# Patient Record
Sex: FEMALE | Race: WHITE | Employment: FULL TIME | URBAN - METROPOLITAN AREA
[De-identification: names, ages, dates, MRNs, and addresses within clinical notes are randomized per-mention and may not be internally consistent; named-entity substitution may affect disease eponyms.]

---

## 2019-04-12 ENCOUNTER — OFFICE VISIT (OUTPATIENT)
Dept: ENDOCRINOLOGY | Age: 49
End: 2019-04-12
Payer: COMMERCIAL

## 2019-04-12 VITALS
OXYGEN SATURATION: 99 % | DIASTOLIC BLOOD PRESSURE: 90 MMHG | SYSTOLIC BLOOD PRESSURE: 148 MMHG | HEART RATE: 84 BPM | HEIGHT: 68 IN | WEIGHT: 192 LBS | BODY MASS INDEX: 29.1 KG/M2 | RESPIRATION RATE: 16 BRPM

## 2019-04-12 DIAGNOSIS — E55.9 VITAMIN D DEFICIENCY: ICD-10-CM

## 2019-04-12 DIAGNOSIS — R53.82 CHRONIC FATIGUE: Primary | ICD-10-CM

## 2019-04-12 PROCEDURE — 4004F PT TOBACCO SCREEN RCVD TLK: CPT | Performed by: INTERNAL MEDICINE

## 2019-04-12 PROCEDURE — 99204 OFFICE O/P NEW MOD 45 MIN: CPT | Performed by: INTERNAL MEDICINE

## 2019-04-12 PROCEDURE — G8427 DOCREV CUR MEDS BY ELIG CLIN: HCPCS | Performed by: INTERNAL MEDICINE

## 2019-04-12 PROCEDURE — G8419 CALC BMI OUT NRM PARAM NOF/U: HCPCS | Performed by: INTERNAL MEDICINE

## 2019-04-12 RX ORDER — CHOLECALCIFEROL (VITAMIN D3) 1250 MCG
CAPSULE ORAL WEEKLY
COMMUNITY

## 2019-04-12 RX ORDER — NAPROXEN 375 MG/1
375 TABLET ORAL 2 TIMES DAILY WITH MEALS
COMMUNITY
End: 2021-09-20

## 2019-04-12 RX ORDER — ALPRAZOLAM 0.25 MG/1
0.25 TABLET ORAL NIGHTLY PRN
COMMUNITY

## 2019-04-12 NOTE — LETTER
[address] [place] [address] Department of Endocrinology Diabetes and Metabolism       Provider: Latrice Robbins MD  [address] [facility] [address]   Phone: [phone]  Fax: [phone]    Primary Care Physician: Lesli Moralez DO   Referring Provider: Roseanne Beckman DO    Patient: Margret Mendoza  YOB: 1970  Date of Visit: 4/12/2019      Dear Dr. Lesli Moralez DO   I had the pleasure of seeing your patient Margret Mendoza today at endocrine clinic for consultation visit and I enclosed a copy of the office visit completed today. Thank you very much for asking us to participate in the care of this very pleasant patient. Please don't hesitate to call if there are any further questions or concerns. Sincerely   Latrice Robbins MD  Endocrinologist, [facility]   [address] [facility] [address]   Phone: [phone]  Fax: [phone]      ENDOCRINOLOGY CLINIC NOTE    Date of Service: 4/12/2019    Medical Records Reviewed:   I personally reviewed and summarized previous records     Care Team:   Primary Care Physician: Lesli Moralez DO  Referring physician: Roseanne Beckman DO  Provider: Latrice Robbins MD        Reason for the visit:  Fatigue, evaluation of possible adrenal insufficiency     History of Present Illness: The history is provided by the patient. No  was used. Accuracy of the patient data is excellent.          Margret Mendoza is a very pleasant 54 y.o. female with past medical history of fibromyalgia seen in the clinic today for evaluation of possible endocrine etiology of her current fatigue and tiredness   Over the past 7 years patient was c/o tiredness and fatigue and was told that she had adrenal fatigue   I reviewed all previous cortisol level and most readings were done later in the day as shown below   On last lab (3/17/2016) pt admit that she was taking adrenal support supplements   Component 9/28/2016 5/26/2016 3/17/2016 12:00 PM 12:00 PM 10:50 AM   Cortisol 7.12 5.82 5.17     Pt stopped all these supplements almost 8 months ago    Today she still c/o fatigue, dry skin, brittle fingernails, brittle hair  and depressed mood.     Previous work showed normal thyroid function test.     PAST MEDICAL HISTORY   Past Medical History:   Diagnosis Date    Anxiety     Cervical dysplasia     History LEEP 's    Fibromyalgia     Gestational diabetes 2006    PMS (premenstrual syndrome)     RA [facility]oid [facility]) ([facility] [address]     Stress headaches      PAST SURGICAL HISTORY   Past Surgical History:   Procedure Laterality Date    CARPAL TUNNEL RELEASE      both hands     SECTION      CHOLECYSTECTOMY      COLPOSCOPY      DILATION AND CURETTAGE OF UTERUS      LEEP      NASAL SINUS SURGERY      TUBAL LIGATION       SOCIAL HISTORY   Social History     Socioeconomic History    Marital status:      Spouse name: Not on file    Number of children: Not on file    Years of education: Not on file    Highest education level: Not on file   Occupational History    Not on file   Social Needs    Financial resource strain: Not on file    Food insecurity:     Worry: Not on file     Inability: Not on file    Transportation needs:     Medical: Not on file     Non-medical: Not on file   Tobacco Use    Smoking status: Current Every Day Smoker     Packs/day: 0.25     Types: Cigarettes    Smokeless tobacco: Never Used   Substance and Sexual Activity    Alcohol use: No     Alcohol/week: 0.0 oz    Drug use: No    Sexual activity: Yes     Partners: Male   Lifestyle    Physical activity:     Days per week: Not on file     Minutes per session: Not on file    Stress: Not on file   Relationships    Social connections:     Talks on phone: Not on file     Gets together: Not on file     Attends Confucianist service: Not on file     Active member of club or organization: Not on file     Attends meetings of clubs or organizations: Not on file Relationship status: Not on file    Intimate partner violence:     Fear of current or ex partner: Not on file     Emotionally abused: Not on file     Physically abused: Not on file     Forced sexual activity: Not on file   Other Topics Concern    Not on file   Social History Narrative    Not on file     FAMILY HISTORY   Family History   Problem Relation Age of Onset    Thyroid Disease Mother     Diabetes Mother         pre diabetic    Heart Disease Father     Heart Attack Father 52            Diabetes Maternal Grandmother     Heart Attack Maternal Grandmother     Heart Disease Maternal Grandmother     High Blood Pressure Maternal Grandmother     Stroke Maternal Grandmother     Diabetes Maternal Aunt     Diabetes Maternal Grandfather      ALLERGIES AND DRUG REACTIONS   No Known Allergies    CURRENT MEDICATIONS   Current Outpatient Medications   Medication Sig Dispense Refill    Cholecalciferol (VITAMIN D3) 12511 units CAPS Take by mouth once a week      ALPRAZolam (XANAX) 0.25 MG tablet Take 0.25 mg by mouth nightly as needed for Sleep.  naproxen (NAPROSYN) 375 MG tablet Take 375 mg by mouth 2 times daily (with meals)      medical marijuana Take by mouth as needed.  HYDROcodone-acetaminophen (NORCO)  MG per tablet take 1 tablet by mouth three times a day  0    cyclobenzaprine (FLEXERIL) 10 MG tablet Take 10 mg by mouth as needed for Muscle spasms       No current facility-administered medications for this visit. Review of Systems  Constitutional: No fever, no chills, no diaphoresis, no generalized weakness. HEENT: No blurred vision, No sore throat, no ear pain, no hair loss  Neck: denied any neck swelling, difficulty swallowing,   Cadrdiopulomary: No CP, SOB or palpitation, No orthopnea or PND. No cough or wheezing.   GI: No N/V/D, no constipation, No abdominal pain, no melena or hematochezia : Denied any dysuria, hematuria, flank pain, discharge, or incontinence. Skin: denied any rash, ulcer, Hirsute, or hyperpigmentation. MSK: denied any joint deformity, joint pain/swelling, muscle pain, or back pain. Neuro: no numbess, no tingling, no weakness,     OBJECTIVE    BP (!) 148/90 (Site: Right Upper Arm, Position: Sitting, Cuff Size: Medium Adult)   Pulse 84   Resp 16   Ht 5' 8\" (1.727 m)   Wt 192 lb (87.1 kg)   LMP 09/01/2015   SpO2 99%   BMI 29.19 kg/m²    BP Readings from Last 4 Encounters:   04/12/19 (!) 148/90   09/28/16 124/76   07/27/16 130/80   05/26/16 124/78     Wt Readings from Last 6 Encounters:   04/12/19 192 lb (87.1 kg)   09/28/16 195 lb 3.2 oz (88.5 kg)   07/27/16 199 lb 9.6 oz (90.5 kg)   05/26/16 208 lb (94.3 kg)   04/14/16 209 lb 3.2 oz (94.9 kg)   03/17/16 204 lb (92.5 kg)       Physical examination:  General: awake alert, oriented x3, no abnormal position or movements. HEENT: normocephalic non traumatic  Neck: supple, no LN enlargement, no thyromegaly, no thyroid tenderness, no JVD. Pulm: Clear equal air entry no added sounds, no wheezing or rhonchi    CVS: S1 + S2, no murmur, no heave. Dorsalis pedis pulse palpable   Abd: soft lax, no tenderness, no organomegaly, audible bowel sounds. Skin: warm, no lesions, no rash.   Musculoskeletal: No back tenderness, no kyphosis/scoliosis   Neuro: CN intact, sensation normal , muscle power normal.  Psych: normal mood, and affect    Review of Laboratory Data:  I have reviewed the following:  3/2018  BS 96, Cr 0.9, GFR >60, Va 9.3, Na 143, K 3.9, ALT 16, AST 16, WBC 7.5, Plt 192, Hb 15.5, TSH 2.65, free T4 1.1, Cortisol 1.3 (done was 5pm), B12 1515, VitD 22  Lab Results   Component Value Date/Time    WBC 6.8 02/12/2016 08:24 AM    RBC 5.10 02/12/2016 08:24 AM    HGB 14.9 02/12/2016 08:24 AM    HCT 46.0 02/12/2016 08:24 AM    MCV 90.2 02/12/2016 08:24 AM    MCH 29.3 02/12/2016 08:24 AM    MCHC 32.5 02/12/2016 08:24 AM RDW 14.1 02/12/2016 08:24 AM     02/12/2016 08:24 AM    MPV 9.9 02/12/2016 08:24 AM      Lab Results   Component Value Date/Time     02/12/2016 08:24 AM    K 4.6 02/12/2016 08:24 AM    CO2 23 02/12/2016 08:24 AM    BUN 13 02/12/2016 08:24 AM    CREATININE 0.9 02/12/2016 08:24 AM    CALCIUM 9.5 02/12/2016 08:24 AM    LABGLOM >60 02/12/2016 08:24 AM    GFRAA >60 02/12/2016 08:24 AM      Lab Results   Component Value Date/Time    TSH 1.430 09/28/2016 12:00 PM    T4FREE 1.10 09/28/2016 12:00 PM    FT3 3.2 09/28/2016 12:00 PM    FT3 3.4 03/23/2016 01:00 PM    FT3 2.8 10/05/2015 03:00 PM    TPOABS 0.9 02/22/2016 12:57 PM    THGAB <0.9 02/22/2016 12:57 PM     Lab Results   Component Value Date    GLUCOSE 92 02/12/2016    GLUCOSE 91 03/08/2011     Lab Results   Component Value Date    CHOL 256 02/12/2016    CHOL 222 11/27/2012    CHOL 249 03/08/2011    TRIG 174 02/12/2016    TRIG 175 11/27/2012    HDL 49 02/12/2016    HDL 41.0 11/27/2012     Lab Results   Component Value Date    VITD25 34 09/28/2016    VITD25 45 10/05/2015       Medical Records/Labs/Images Review:   I personally reviewed and summarized previous records   All labs were reviewed independently    [address]   Ramandeep Jones, a 54 y.o.-old female seen in for following issues     Chronic fatigue   · Depression likely playing a major role in her current symptomatology   · I don't think she has adrenal insufficiency. Previous low cortisol level in 3/2016 was likely from all adrenal supplements that she was taking   · I will order AM cortisol and ACTH and proceed accordingly   · Will also check TFT, VitD and B12 level   · With positive family history of thyroid disease, will check thyroid Abs     VitD deficiency   · Continue vitD supplements     No follow-ups on file. The above issues were reviewed with the patient who understood and agreed with the plan. Thank you for allowing us to participate in the care of this patient. Please do not hesitate to contact us with any additional questions. Diagnosis Orders   1. Chronic fatigue  ACTH    Cortisol Total    Basic Metabolic Panel    Vitamin B12    TSH without Reflex    T4, Free    T4    Thyroid AB   2.  Vitamin D deficiency  Vitamin D 25 Hydrox, D2 & D3       Thang Flowers MD  Endocrinologist, [facility])   [facility] [address] [id number]   Phone: [phone]  Fax: [phone]

## 2019-04-12 NOTE — PROGRESS NOTES
ENDOCRINOLOGY CLINIC NOTE    Date of Service: 2019    Medical Records Reviewed:   I personally reviewed and summarized previous records     Care Team:   Primary Care Physician: Lesli Moralez DO  Referring physician: Roseanne Beckman DO  Provider: Latrice Robbins MD        Reason for the visit:  Fatigue, evaluation of possible adrenal insufficiency     History of Present Illness: The history is provided by the patient. No  was used. Accuracy of the patient data is excellent. Margret Mendoza is a very pleasant 54 y.o. female with past medical history of fibromyalgia seen in the clinic today for evaluation of possible endocrine etiology of her current fatigue and tiredness   Over the past 7 years patient was c/o tiredness and fatigue and was told that she had adrenal fatigue   I reviewed all previous cortisol level and most readings were done later in the day as shown below   On last lab (3/17/2016) pt admit that she was taking adrenal support supplements   Component 2016 2016 3/17/2016    12:00 PM 12:00 PM 10:50 AM   Cortisol 7.12 5.82 5.17     Pt stopped all these supplements almost 8 months ago    Today she still c/o fatigue, dry skin, brittle fingernails, brittle hair  and depressed mood.     Previous work showed normal thyroid function test.     PAST MEDICAL HISTORY   Past Medical History:   Diagnosis Date    Anxiety     Cervical dysplasia     History LEEP     Fibromyalgia     Gestational diabetes     PMS (premenstrual syndrome)     RA (rhe[facility]oid [facility]) ([facility] [address]     Stress headaches      PAST SURGICAL HISTORY   Past Surgical History:   Procedure Laterality Date    CARPAL TUNNEL RELEASE      both hands     SECTION      CHOLECYSTECTOMY      COLPOSCOPY      DILATION AND CURETTAGE OF UTERUS      LEEP      NASAL SINUS SURGERY      TUBAL LIGATION       SOCIAL HISTORY   Social History     Socioeconomic History    Marital status:  Spouse name: Not on file    Number of children: Not on file    Years of education: Not on file    Highest education level: Not on file   Occupational History    Not on file   Social Needs    Financial resource strain: Not on file    Food insecurity:     Worry: Not on file     Inability: Not on file    Transportation needs:     Medical: Not on file     Non-medical: Not on file   Tobacco Use    Smoking status: Current Every Day Smoker     Packs/day: 0.25     Types: Cigarettes    Smokeless tobacco: Never Used   Substance and Sexual Activity    Alcohol use: No     Alcohol/week: 0.0 oz    Drug use: No    Sexual activity: Yes     Partners: Male   Lifestyle    Physical activity:     Days per week: Not on file     Minutes per session: Not on file    Stress: Not on file   Relationships    Social connections:     Talks on phone: Not on file     Gets together: Not on file     Attends Moravian service: Not on file     Active member of club or organization: Not on file     Attends meetings of clubs or organizations: Not on file     Relationship status: Not on file    Intimate partner violence:     Fear of current or ex partner: Not on file     Emotionally abused: Not on file     Physically abused: Not on file     Forced sexual activity: Not on file   Other Topics Concern    Not on file   Social History Narrative    Not on file     FAMILY HISTORY   Family History   Problem Relation Age of Onset    Thyroid Disease Mother     Diabetes Mother         pre diabetic    Heart Disease Father     Heart Attack Father 52            Diabetes Maternal Grandmother     Heart Attack Maternal Grandmother     Heart Disease Maternal Grandmother     High Blood Pressure Maternal Grandmother     Stroke Maternal Grandmother     Diabetes Maternal Aunt     Diabetes Maternal Grandfather      ALLERGIES AND DRUG REACTIONS   No Known Allergies    CURRENT MEDICATIONS   Current Outpatient Medications   Medication Sig Dispense Refill    Cholecalciferol (VITAMIN D3) 60330 units CAPS Take by mouth once a week      ALPRAZolam (XANAX) 0.25 MG tablet Take 0.25 mg by mouth nightly as needed for Sleep.  naproxen (NAPROSYN) 375 MG tablet Take 375 mg by mouth 2 times daily (with meals)      medical marijuana Take by mouth as needed.  HYDROcodone-acetaminophen (NORCO)  MG per tablet take 1 tablet by mouth three times a day  0    cyclobenzaprine (FLEXERIL) 10 MG tablet Take 10 mg by mouth as needed for Muscle spasms       No current facility-administered medications for this visit. Review of Systems  Constitutional: No fever, no chills, no diaphoresis, no generalized weakness. HEENT: No blurred vision, No sore throat, no ear pain, no hair loss  Neck: denied any neck swelling, difficulty swallowing,   Cadrdiopulomary: No CP, SOB or palpitation, No orthopnea or PND. No cough or wheezing. GI: No N/V/D, no constipation, No abdominal pain, no melena or hematochezia   : Denied any dysuria, hematuria, flank pain, discharge, or incontinence. Skin: denied any rash, ulcer, Hirsute, or hyperpigmentation. MSK: denied any joint deformity, joint pain/swelling, muscle pain, or back pain. Neuro: no numbess, no tingling, no weakness,     OBJECTIVE    BP (!) 148/90 (Site: Right Upper Arm, Position: Sitting, Cuff Size: Medium Adult)   Pulse 84   Resp 16   Ht 5' 8\" (1.727 m)   Wt 192 lb (87.1 kg)   LMP 09/01/2015   SpO2 99%   BMI 29.19 kg/m²   BP Readings from Last 4 Encounters:   04/12/19 (!) 148/90   09/28/16 124/76   07/27/16 130/80   05/26/16 124/78     Wt Readings from Last 6 Encounters:   04/12/19 192 lb (87.1 kg)   09/28/16 195 lb 3.2 oz (88.5 kg)   07/27/16 199 lb 9.6 oz (90.5 kg)   05/26/16 208 lb (94.3 kg)   04/14/16 209 lb 3.2 oz (94.9 kg)   03/17/16 204 lb (92.5 kg)       Physical examination:  General: awake alert, oriented x3, no abnormal position or movements.    HEENT: normocephalic non traumatic  Neck: supple, no LN enlargement, no thyromegaly, no thyroid tenderness, no JVD. Pulm: Clear equal air entry no added sounds, no wheezing or rhonchi    CVS: S1 + S2, no murmur, no heave. Dorsalis pedis pulse palpable   Abd: soft lax, no tenderness, no organomegaly, audible bowel sounds. Skin: warm, no lesions, no rash.   Musculoskeletal: No back tenderness, no kyphosis/scoliosis   Neuro: CN intact, sensation normal , muscle power normal.  Psych: normal mood, and affect    Review of Laboratory Data:  I have reviewed the following:  3/2018  BS 96, Cr 0.9, GFR >60, Va 9.3, Na 143, K 3.9, ALT 16, AST 16, WBC 7.5, Plt 192, Hb 15.5, TSH 2.65, free T4 1.1, Cortisol 1.3 (done was 5pm), B12 1515, VitD 22  Lab Results   Component Value Date/Time    WBC 6.8 02/12/2016 08:24 AM    RBC 5.10 02/12/2016 08:24 AM    HGB 14.9 02/12/2016 08:24 AM    HCT 46.0 02/12/2016 08:24 AM    MCV 90.2 02/12/2016 08:24 AM    MCH 29.3 02/12/2016 08:24 AM    MCHC 32.5 02/12/2016 08:24 AM    RDW 14.1 02/12/2016 08:24 AM     02/12/2016 08:24 AM    MPV 9.9 02/12/2016 08:24 AM      Lab Results   Component Value Date/Time     02/12/2016 08:24 AM    K 4.6 02/12/2016 08:24 AM    CO2 23 02/12/2016 08:24 AM    BUN 13 02/12/2016 08:24 AM    CREATININE 0.9 02/12/2016 08:24 AM    CALCIUM 9.5 02/12/2016 08:24 AM    LABGLOM >60 02/12/2016 08:24 AM    GFRAA >60 02/12/2016 08:24 AM      Lab Results   Component Value Date/Time    TSH 1.430 09/28/2016 12:00 PM    T4FREE 1.10 09/28/2016 12:00 PM    FT3 3.2 09/28/2016 12:00 PM    FT3 3.4 03/23/2016 01:00 PM    FT3 2.8 10/05/2015 03:00 PM    TPOABS 0.9 02/22/2016 12:57 PM    THGAB <0.9 02/22/2016 12:57 PM     Lab Results   Component Value Date    GLUCOSE 92 02/12/2016    GLUCOSE 91 03/08/2011     Lab Results   Component Value Date    CHOL 256 02/12/2016    CHOL 222 11/27/2012    CHOL 249 03/08/2011    TRIG 174 02/12/2016    TRIG 175 11/27/2012    HDL 49 02/12/2016    HDL 41.0 11/27/2012     Lab Results Component Value Date    VITD25 34 09/28/2016    VITD25 45 10/05/2015       Medical Records/Labs/Images Review:   I personally reviewed and summarized previous records   All labs were reviewed independently    [place] [address]   Darell Urbano, a 54 y.o.-old female seen in for following issues     Chronic fatigue   · Depression likely playing a major role in her current symptomatology   · I don't think she has adrenal insufficiency. Previous low cortisol level in 3/2016 was likely from all adrenal supplements that she was taking   · I will order AM cortisol and ACTH and proceed accordingly   · Will also check TFT, VitD and B12 level   · With positive family history of thyroid disease, will check thyroid Abs     VitD deficiency   · Continue vitD supplements     No follow-ups on file. The above issues were reviewed with the patient who understood and agreed with the plan. Thank you for allowing us to participate in the care of this patient. Please do not hesitate to contact us with any additional questions. Diagnosis Orders   1. Chronic fatigue  ACTH    Cortisol Total    Basic Metabolic Panel    Vitamin B12    TSH without Reflex    T4, Free    T4    Thyroid AB   2.  Vitamin D deficiency  Vitamin D 25 Hydrox, D2 & D3       Cassidy Fisher MD  Endocrinologist, [facility])   [address] [facility] [address]   Phone: [phone]  Fax: [phone]

## 2021-08-10 ENCOUNTER — OFFICE VISIT (OUTPATIENT)
Dept: ORTHOPEDIC SURGERY | Age: 51
End: 2021-08-10
Payer: COMMERCIAL

## 2021-08-10 ENCOUNTER — TELEPHONE (OUTPATIENT)
Dept: ORTHOPEDIC SURGERY | Age: 51
End: 2021-08-10

## 2021-08-10 VITALS — WEIGHT: 180 LBS | BODY MASS INDEX: 27.28 KG/M2 | RESPIRATION RATE: 18 BRPM | HEIGHT: 68 IN

## 2021-08-10 DIAGNOSIS — M72.0 DUPUYTREN'S CONTRACTURE OF LEFT HAND: Primary | ICD-10-CM

## 2021-08-10 DIAGNOSIS — M65.352 TRIGGER FINGER, LEFT LITTLE FINGER: Primary | ICD-10-CM

## 2021-08-10 PROCEDURE — 99204 OFFICE O/P NEW MOD 45 MIN: CPT | Performed by: ORTHOPAEDIC SURGERY

## 2021-08-10 NOTE — PROGRESS NOTES
reports no history of alcohol use. DRUGS:   reports no history of drug use. ACTIVITIES OF DAILY LIVING:    OCCUPATION:    Family History:       Problem Relation Age of Onset    Thyroid Disease Mother     Diabetes Mother         pre diabetic    Heart Disease Father     Heart Attack Father 52            Diabetes Maternal Grandmother     Heart Attack Maternal Grandmother     Heart Disease Maternal Grandmother     High Blood Pressure Maternal Grandmother     Stroke Maternal Grandmother     Diabetes Maternal Aunt     Diabetes Maternal Grandfather        REVIEW OF SYSTEMS:  CONSTITUTIONAL:  negative  EYES:  negative  HEENT:  negative  RESPIRATORY:  negative  CARDIOVASCULAR:  negative  GASTROINTESTINAL:  negative  GENITOURINARY:  negative  INTEGUMENT/BREAST:  negative  HEMATOLOGIC/LYMPHATIC:  negative  ALLERGIC/IMMUNOLOGIC:  negative  ENDOCRINE:  negative  MUSCULOSKELETAL:  Left hand contracture  NEUROLOGICAL:  negative  BEHAVIOR/PSYCH:  negative    PHYSICAL EXAM:    VITALS:  Resp 18   Ht 5' 8\" (1.727 m)   Wt 180 lb (81.6 kg)   LMP 10/15/2015   BMI 27.37 kg/m²   CONSTITUTIONAL:  awake, alert, cooperative, no apparent distress, and appears stated age  EYES:  Lids and lashes normal, pupils equal, round and reactive to light, extra ocular muscles intact, sclera clear, conjunctiva normal  ENT:  Normocephalic, without obvious abnormality, atraumatic, sinuses nontender on palpation, external ears without lesions, oral pharynx with moist mucus membranes, tonsils without erythema or exudates, gums normal and good dentition. NECK:  Supple, symmetrical, trachea midline, no adenopathy, thyroid symmetric, not enlarged and no tenderness, skin normal  LUNGS:  CTA  CARDIOVASCULAR:  2+ radial pulses, extremities warm and well perfused  ABDOMEN:  NTTP  CHEST:  Atraumatic   GENITAL/URINARY:  deferred  NEUROLOGIC:  Awake, alert, oriented to name, place and time. Cranial nerves II-XII are grossly intact.   Motor is 5 out of 5 bilaterally. Sensory is intact.  gait is normal.  MUSCULOSKELETAL:    Left upper extremity: Non-tender about the shoulder and elbow with good ROM. - tinels of the cubital tunnel, - tinels of the carpal tunnel, - durkans, - finkelsteins, - CMC grind, - tenderness over the A1 pulleys with no active triggering. Small finger PIP joint flexion contracture of 65 degrees with dupuytrens nodules to the index, middle, ring, and small fingers. Median, ulnar, radial n intact to light touch. Brisk capillary refill. Gross motor 5/5. DATA:    CBC:   Lab Results   Component Value Date    WBC 6.8 02/12/2016    RBC 5.10 02/12/2016    HGB 14.9 02/12/2016    HCT 46.0 02/12/2016    MCV 90.2 02/12/2016    MCH 29.3 02/12/2016    MCHC 32.5 02/12/2016    RDW 14.1 02/12/2016     02/12/2016    MPV 9.9 02/12/2016     PT/INR:  No results found for: PROTIME, INR    Radiology Review:  Xray: x-rays of the left hand were obtained today in the office and reviewed with the patient. 3 views: AP, lateral, oblique: demonstrate no acute fracture or dislocation. Small finger is held in flexion at the PIP joint. Impression: No acute fracture dislocations      IMPRESSION:  · Left small finger dupuytrens contracture  · Bilateral dupuytrens nodules  · Bilateral Garrod's nodes  · Bilateral middle finger trigger  · Fibromyalgia  · RA    PLAN:  Discussed findings with patient. Discussed conservative and surgical management with the patient. Discussed Xiaflex injection to the small finger PIP joint versus surgical management. Patient is leaning towards proceeding with surgery. Plan for a left subtotal palmar fasciectomy with small finger Dupuytren's contracture release and middle and ring finger nodule excision with possible small finger nodule excision. Did discuss Dupuytren's with the patient. Educated the patient on Dupuytren's. Patient understands there is no cure for Dupuytren's. Literature was provided to the patient.

## 2021-09-16 ENCOUNTER — PREP FOR PROCEDURE (OUTPATIENT)
Dept: ORTHOPEDIC SURGERY | Age: 51
End: 2021-09-16

## 2021-09-16 RX ORDER — SODIUM CHLORIDE 9 MG/ML
INJECTION, SOLUTION INTRAVENOUS CONTINUOUS
Status: CANCELLED | OUTPATIENT
Start: 2021-09-16

## 2021-09-16 RX ORDER — SODIUM CHLORIDE 0.9 % (FLUSH) 0.9 %
5-40 SYRINGE (ML) INJECTION EVERY 12 HOURS SCHEDULED
Status: CANCELLED | OUTPATIENT
Start: 2021-09-16

## 2021-09-16 RX ORDER — SODIUM CHLORIDE 0.9 % (FLUSH) 0.9 %
5-40 SYRINGE (ML) INJECTION PRN
Status: CANCELLED | OUTPATIENT
Start: 2021-09-16

## 2021-09-16 RX ORDER — SODIUM CHLORIDE 9 MG/ML
25 INJECTION, SOLUTION INTRAVENOUS PRN
Status: CANCELLED | OUTPATIENT
Start: 2021-09-16

## 2021-09-17 ENCOUNTER — HOSPITAL ENCOUNTER (OUTPATIENT)
Age: 51
Discharge: HOME | End: 2021-09-19
Payer: COMMERCIAL

## 2021-09-17 DIAGNOSIS — Z01.818 PREOP TESTING: ICD-10-CM

## 2021-09-17 PROCEDURE — U0003 INFECTIOUS AGENT DETECTION BY NUCLEIC ACID (DNA OR RNA); SEVERE ACUTE RESPIRATORY SYNDROME CORONAVIRUS 2 (SARS-COV-2) (CORONAVIRUS DISEASE [COVID-19]), AMPLIFIED PROBE TECHNIQUE, MAKING USE OF HIGH THROUGHPUT TECHNOLOGIES AS DESCRIBED BY CMS-2020-01-R: HCPCS

## 2021-09-17 PROCEDURE — U0005 INFEC AGEN DETEC AMPLI PROBE: HCPCS

## 2021-09-18 LAB
SARS-COV-2: NOT DETECTED
SOURCE: NORMAL

## 2021-09-21 ENCOUNTER — ANESTHESIA EVENT (OUTPATIENT)
Dept: OPERATING ROOM | Age: 51
End: 2021-09-21
Payer: COMMERCIAL

## 2021-09-22 ENCOUNTER — ANESTHESIA (OUTPATIENT)
Dept: OPERATING ROOM | Age: 51
End: 2021-09-22
Payer: COMMERCIAL

## 2021-09-22 ENCOUNTER — HOSPITAL ENCOUNTER (OUTPATIENT)
Age: 51
Setting detail: OUTPATIENT SURGERY
Discharge: HOME | End: 2021-09-22
Attending: ORTHOPAEDIC SURGERY | Admitting: ORTHOPAEDIC SURGERY
Payer: COMMERCIAL

## 2021-09-22 ENCOUNTER — TELEPHONE (OUTPATIENT)
Dept: ORTHOPEDIC SURGERY | Age: 51
End: 2021-09-22

## 2021-09-22 VITALS
SYSTOLIC BLOOD PRESSURE: 103 MMHG | OXYGEN SATURATION: 99 % | TEMPERATURE: 97 F | DIASTOLIC BLOOD PRESSURE: 56 MMHG | RESPIRATION RATE: 1 BRPM

## 2021-09-22 VITALS
HEART RATE: 70 BPM | TEMPERATURE: 97.9 F | HEIGHT: 68 IN | WEIGHT: 185 LBS | OXYGEN SATURATION: 98 % | DIASTOLIC BLOOD PRESSURE: 90 MMHG | SYSTOLIC BLOOD PRESSURE: 129 MMHG | BODY MASS INDEX: 28.04 KG/M2 | RESPIRATION RATE: 17 BRPM

## 2021-09-22 DIAGNOSIS — Z01.818 PREOP TESTING: Primary | ICD-10-CM

## 2021-09-22 DIAGNOSIS — M72.0 DUPUYTREN'S CONTRACTURE OF LEFT HAND: Primary | ICD-10-CM

## 2021-09-22 PROCEDURE — 88304 TISSUE EXAM BY PATHOLOGIST: CPT

## 2021-09-22 PROCEDURE — 2500000003 HC RX 250 WO HCPCS: Performed by: NURSE ANESTHETIST, CERTIFIED REGISTERED

## 2021-09-22 PROCEDURE — 26125 RELEASE PALM CONTRACTURE: CPT | Performed by: ORTHOPAEDIC SURGERY

## 2021-09-22 PROCEDURE — 3600000002 HC SURGERY LEVEL 2 BASE: Performed by: ORTHOPAEDIC SURGERY

## 2021-09-22 PROCEDURE — 6370000000 HC RX 637 (ALT 250 FOR IP): Performed by: ORTHOPAEDIC SURGERY

## 2021-09-22 PROCEDURE — 2580000003 HC RX 258: Performed by: NURSE ANESTHETIST, CERTIFIED REGISTERED

## 2021-09-22 PROCEDURE — 6370000000 HC RX 637 (ALT 250 FOR IP): Performed by: ANESTHESIOLOGY

## 2021-09-22 PROCEDURE — 3700000001 HC ADD 15 MINUTES (ANESTHESIA): Performed by: ORTHOPAEDIC SURGERY

## 2021-09-22 PROCEDURE — 6360000002 HC RX W HCPCS: Performed by: PHYSICIAN ASSISTANT

## 2021-09-22 PROCEDURE — 26123 RELEASE PALM CONTRACTURE: CPT | Performed by: ORTHOPAEDIC SURGERY

## 2021-09-22 PROCEDURE — 7100000010 HC PHASE II RECOVERY - FIRST 15 MIN: Performed by: ORTHOPAEDIC SURGERY

## 2021-09-22 PROCEDURE — 3600000012 HC SURGERY LEVEL 2 ADDTL 15MIN: Performed by: ORTHOPAEDIC SURGERY

## 2021-09-22 PROCEDURE — 3700000000 HC ANESTHESIA ATTENDED CARE: Performed by: ORTHOPAEDIC SURGERY

## 2021-09-22 PROCEDURE — 7100000000 HC PACU RECOVERY - FIRST 15 MIN: Performed by: ORTHOPAEDIC SURGERY

## 2021-09-22 PROCEDURE — 7100000011 HC PHASE II RECOVERY - ADDTL 15 MIN: Performed by: ORTHOPAEDIC SURGERY

## 2021-09-22 PROCEDURE — 2500000003 HC RX 250 WO HCPCS: Performed by: ORTHOPAEDIC SURGERY

## 2021-09-22 PROCEDURE — 7100000001 HC PACU RECOVERY - ADDTL 15 MIN: Performed by: ORTHOPAEDIC SURGERY

## 2021-09-22 PROCEDURE — 6360000002 HC RX W HCPCS: Performed by: ANESTHESIOLOGY

## 2021-09-22 PROCEDURE — 2709999900 HC NON-CHARGEABLE SUPPLY: Performed by: ORTHOPAEDIC SURGERY

## 2021-09-22 PROCEDURE — 6360000002 HC RX W HCPCS: Performed by: NURSE ANESTHETIST, CERTIFIED REGISTERED

## 2021-09-22 RX ORDER — KETOROLAC TROMETHAMINE 30 MG/ML
INJECTION, SOLUTION INTRAMUSCULAR; INTRAVENOUS PRN
Status: DISCONTINUED | OUTPATIENT
Start: 2021-09-22 | End: 2021-09-22 | Stop reason: SDUPTHER

## 2021-09-22 RX ORDER — SEVOFLURANE 250 ML/250ML
LIQUID RESPIRATORY (INHALATION) CONTINUOUS PRN
Status: DISCONTINUED | OUTPATIENT
Start: 2021-09-22 | End: 2021-09-22 | Stop reason: SDUPTHER

## 2021-09-22 RX ORDER — MEPERIDINE HYDROCHLORIDE 25 MG/ML
12.5 INJECTION INTRAMUSCULAR; INTRAVENOUS; SUBCUTANEOUS EVERY 5 MIN PRN
Status: DISCONTINUED | OUTPATIENT
Start: 2021-09-22 | End: 2021-09-22 | Stop reason: HOSPADM

## 2021-09-22 RX ORDER — SODIUM CHLORIDE, SODIUM LACTATE, POTASSIUM CHLORIDE, CALCIUM CHLORIDE 600; 310; 30; 20 MG/100ML; MG/100ML; MG/100ML; MG/100ML
INJECTION, SOLUTION INTRAVENOUS CONTINUOUS PRN
Status: DISCONTINUED | OUTPATIENT
Start: 2021-09-22 | End: 2021-09-22 | Stop reason: SDUPTHER

## 2021-09-22 RX ORDER — SODIUM CHLORIDE 9 MG/ML
INJECTION, SOLUTION INTRAVENOUS CONTINUOUS
Status: DISCONTINUED | OUTPATIENT
Start: 2021-09-22 | End: 2021-09-22 | Stop reason: HOSPADM

## 2021-09-22 RX ORDER — FENTANYL CITRATE 50 UG/ML
INJECTION, SOLUTION INTRAMUSCULAR; INTRAVENOUS PRN
Status: DISCONTINUED | OUTPATIENT
Start: 2021-09-22 | End: 2021-09-22 | Stop reason: SDUPTHER

## 2021-09-22 RX ORDER — SODIUM CHLORIDE 0.9 % (FLUSH) 0.9 %
5-40 SYRINGE (ML) INJECTION EVERY 12 HOURS SCHEDULED
Status: DISCONTINUED | OUTPATIENT
Start: 2021-09-22 | End: 2021-09-22 | Stop reason: HOSPADM

## 2021-09-22 RX ORDER — BUPIVACAINE HYDROCHLORIDE 5 MG/ML
INJECTION, SOLUTION EPIDURAL; INTRACAUDAL PRN
Status: DISCONTINUED | OUTPATIENT
Start: 2021-09-22 | End: 2021-09-22 | Stop reason: ALTCHOICE

## 2021-09-22 RX ORDER — PROPOFOL 10 MG/ML
INJECTION, EMULSION INTRAVENOUS PRN
Status: DISCONTINUED | OUTPATIENT
Start: 2021-09-22 | End: 2021-09-22 | Stop reason: SDUPTHER

## 2021-09-22 RX ORDER — ONDANSETRON 2 MG/ML
INJECTION INTRAMUSCULAR; INTRAVENOUS PRN
Status: DISCONTINUED | OUTPATIENT
Start: 2021-09-22 | End: 2021-09-22 | Stop reason: SDUPTHER

## 2021-09-22 RX ORDER — SODIUM CHLORIDE 9 MG/ML
25 INJECTION, SOLUTION INTRAVENOUS PRN
Status: DISCONTINUED | OUTPATIENT
Start: 2021-09-22 | End: 2021-09-22 | Stop reason: HOSPADM

## 2021-09-22 RX ORDER — SODIUM CHLORIDE 0.9 % (FLUSH) 0.9 %
5-40 SYRINGE (ML) INJECTION PRN
Status: DISCONTINUED | OUTPATIENT
Start: 2021-09-22 | End: 2021-09-22 | Stop reason: HOSPADM

## 2021-09-22 RX ORDER — DEXAMETHASONE SODIUM PHOSPHATE 4 MG/ML
INJECTION, SOLUTION INTRA-ARTICULAR; INTRALESIONAL; INTRAMUSCULAR; INTRAVENOUS; SOFT TISSUE PRN
Status: DISCONTINUED | OUTPATIENT
Start: 2021-09-22 | End: 2021-09-22 | Stop reason: SDUPTHER

## 2021-09-22 RX ORDER — HYDROCODONE BITARTRATE AND ACETAMINOPHEN 5; 325 MG/1; MG/1
2 TABLET ORAL PRN
Status: COMPLETED | OUTPATIENT
Start: 2021-09-22 | End: 2021-09-22

## 2021-09-22 RX ORDER — MIDAZOLAM HYDROCHLORIDE 1 MG/ML
INJECTION INTRAMUSCULAR; INTRAVENOUS PRN
Status: DISCONTINUED | OUTPATIENT
Start: 2021-09-22 | End: 2021-09-22 | Stop reason: SDUPTHER

## 2021-09-22 RX ORDER — HYDROCODONE BITARTRATE AND ACETAMINOPHEN 5; 325 MG/1; MG/1
1 TABLET ORAL PRN
Status: COMPLETED | OUTPATIENT
Start: 2021-09-22 | End: 2021-09-22

## 2021-09-22 RX ORDER — DIPHENHYDRAMINE HYDROCHLORIDE 50 MG/ML
12.5 INJECTION INTRAMUSCULAR; INTRAVENOUS
Status: DISCONTINUED | OUTPATIENT
Start: 2021-09-22 | End: 2021-09-22 | Stop reason: HOSPADM

## 2021-09-22 RX ADMIN — DEXAMETHASONE SODIUM PHOSPHATE 10 MG: 4 INJECTION, SOLUTION INTRAMUSCULAR; INTRAVENOUS at 07:51

## 2021-09-22 RX ADMIN — HYDROCODONE BITARTRATE AND ACETAMINOPHEN 2 TABLET: 5; 325 TABLET ORAL at 11:29

## 2021-09-22 RX ADMIN — KETOROLAC TROMETHAMINE 30 MG: 30 INJECTION, SOLUTION INTRAMUSCULAR; INTRAVENOUS at 08:44

## 2021-09-22 RX ADMIN — PROPOFOL 200 MG: 10 INJECTION, EMULSION INTRAVENOUS at 07:43

## 2021-09-22 RX ADMIN — FENTANYL CITRATE 20 MCG: 50 INJECTION, SOLUTION INTRAMUSCULAR; INTRAVENOUS at 08:35

## 2021-09-22 RX ADMIN — MIDAZOLAM 2 MG: 1 INJECTION INTRAMUSCULAR; INTRAVENOUS at 07:40

## 2021-09-22 RX ADMIN — FENTANYL CITRATE 20 MCG: 50 INJECTION, SOLUTION INTRAMUSCULAR; INTRAVENOUS at 08:41

## 2021-09-22 RX ADMIN — HYDROMORPHONE HYDROCHLORIDE 0.5 MG: 1 INJECTION, SOLUTION INTRAMUSCULAR; INTRAVENOUS; SUBCUTANEOUS at 10:10

## 2021-09-22 RX ADMIN — HYDROMORPHONE HYDROCHLORIDE 0.5 MG: 1 INJECTION, SOLUTION INTRAMUSCULAR; INTRAVENOUS; SUBCUTANEOUS at 10:23

## 2021-09-22 RX ADMIN — Medication 2000 MG: at 07:47

## 2021-09-22 RX ADMIN — SODIUM CHLORIDE, POTASSIUM CHLORIDE, SODIUM LACTATE AND CALCIUM CHLORIDE: 600; 310; 30; 20 INJECTION, SOLUTION INTRAVENOUS at 07:37

## 2021-09-22 RX ADMIN — SODIUM CHLORIDE, POTASSIUM CHLORIDE, SODIUM LACTATE AND CALCIUM CHLORIDE: 600; 310; 30; 20 INJECTION, SOLUTION INTRAVENOUS at 08:34

## 2021-09-22 RX ADMIN — ONDANSETRON 4 MG: 2 INJECTION INTRAMUSCULAR; INTRAVENOUS at 09:01

## 2021-09-22 RX ADMIN — HYDROMORPHONE HYDROCHLORIDE 0.5 MG: 1 INJECTION, SOLUTION INTRAMUSCULAR; INTRAVENOUS; SUBCUTANEOUS at 10:15

## 2021-09-22 RX ADMIN — SEVOFLURANE 1 %: 1 LIQUID RESPIRATORY (INHALATION) at 07:46

## 2021-09-22 RX ADMIN — FENTANYL CITRATE 100 MCG: 50 INJECTION, SOLUTION INTRAMUSCULAR; INTRAVENOUS at 07:43

## 2021-09-22 ASSESSMENT — PAIN SCALES - WONG BAKER
WONGBAKER_NUMERICALRESPONSE: 4
WONGBAKER_NUMERICALRESPONSE: 6
WONGBAKER_NUMERICALRESPONSE: 6
WONGBAKER_NUMERICALRESPONSE: 4
WONGBAKER_NUMERICALRESPONSE: 4
WONGBAKER_NUMERICALRESPONSE: 2
WONGBAKER_NUMERICALRESPONSE: 2
WONGBAKER_NUMERICALRESPONSE: 0
WONGBAKER_NUMERICALRESPONSE: 0

## 2021-09-22 ASSESSMENT — PULMONARY FUNCTION TESTS
PIF_VALUE: 12
PIF_VALUE: 6
PIF_VALUE: 12
PIF_VALUE: 12
PIF_VALUE: 13
PIF_VALUE: 12
PIF_VALUE: 12
PIF_VALUE: 14
PIF_VALUE: 2
PIF_VALUE: 13
PIF_VALUE: 12
PIF_VALUE: 12
PIF_VALUE: 1
PIF_VALUE: 15
PIF_VALUE: 17
PIF_VALUE: 12
PIF_VALUE: 16
PIF_VALUE: 1
PIF_VALUE: 17
PIF_VALUE: 15
PIF_VALUE: 12
PIF_VALUE: 0
PIF_VALUE: 18
PIF_VALUE: 12
PIF_VALUE: 18
PIF_VALUE: 12
PIF_VALUE: 17
PIF_VALUE: 12
PIF_VALUE: 17
PIF_VALUE: 17
PIF_VALUE: 12
PIF_VALUE: 15
PIF_VALUE: 13
PIF_VALUE: 12
PIF_VALUE: 17
PIF_VALUE: 12
PIF_VALUE: 0
PIF_VALUE: 12
PIF_VALUE: 12
PIF_VALUE: 13
PIF_VALUE: 11
PIF_VALUE: 12
PIF_VALUE: 12
PIF_VALUE: 15
PIF_VALUE: 12
PIF_VALUE: 16
PIF_VALUE: 13
PIF_VALUE: 17
PIF_VALUE: 12
PIF_VALUE: 12
PIF_VALUE: 1
PIF_VALUE: 12
PIF_VALUE: 13
PIF_VALUE: 12
PIF_VALUE: 1
PIF_VALUE: 12
PIF_VALUE: 12
PIF_VALUE: 13
PIF_VALUE: 6
PIF_VALUE: 13
PIF_VALUE: 15
PIF_VALUE: 12
PIF_VALUE: 17
PIF_VALUE: 12
PIF_VALUE: 12
PIF_VALUE: 1
PIF_VALUE: 13
PIF_VALUE: 13
PIF_VALUE: 12
PIF_VALUE: 1
PIF_VALUE: 12
PIF_VALUE: 12
PIF_VALUE: 2
PIF_VALUE: 13
PIF_VALUE: 12
PIF_VALUE: 11
PIF_VALUE: 12
PIF_VALUE: 12
PIF_VALUE: 2
PIF_VALUE: 17
PIF_VALUE: 12
PIF_VALUE: 0
PIF_VALUE: 13
PIF_VALUE: 10
PIF_VALUE: 12
PIF_VALUE: 12
PIF_VALUE: 17
PIF_VALUE: 14
PIF_VALUE: 12
PIF_VALUE: 17
PIF_VALUE: 17
PIF_VALUE: 13
PIF_VALUE: 5
PIF_VALUE: 0
PIF_VALUE: 12
PIF_VALUE: 15
PIF_VALUE: 12
PIF_VALUE: 2
PIF_VALUE: 13
PIF_VALUE: 0
PIF_VALUE: 12
PIF_VALUE: 12
PIF_VALUE: 1
PIF_VALUE: 0

## 2021-09-22 ASSESSMENT — PAIN DESCRIPTION - ORIENTATION
ORIENTATION: LEFT

## 2021-09-22 ASSESSMENT — PAIN DESCRIPTION - LOCATION
LOCATION: ARM

## 2021-09-22 ASSESSMENT — PAIN DESCRIPTION - PAIN TYPE
TYPE: ACUTE PAIN;SURGICAL PAIN

## 2021-09-22 ASSESSMENT — LIFESTYLE VARIABLES: SMOKING_STATUS: 1

## 2021-09-22 ASSESSMENT — PAIN DESCRIPTION - DESCRIPTORS
DESCRIPTORS: CONSTANT;DISCOMFORT
DESCRIPTORS: DISCOMFORT;CONSTANT
DESCRIPTORS: CONSTANT;DISCOMFORT
DESCRIPTORS: DISCOMFORT
DESCRIPTORS: DISCOMFORT;CONSTANT
DESCRIPTORS: DISCOMFORT;ACHING

## 2021-09-22 ASSESSMENT — PAIN SCALES - GENERAL
PAINLEVEL_OUTOF10: 7
PAINLEVEL_OUTOF10: 5
PAINLEVEL_OUTOF10: 0
PAINLEVEL_OUTOF10: 5
PAINLEVEL_OUTOF10: 0
PAINLEVEL_OUTOF10: 5
PAINLEVEL_OUTOF10: 4
PAINLEVEL_OUTOF10: 5
PAINLEVEL_OUTOF10: 4

## 2021-09-22 ASSESSMENT — PAIN - FUNCTIONAL ASSESSMENT: PAIN_FUNCTIONAL_ASSESSMENT: 0-10

## 2021-09-22 NOTE — TELEPHONE ENCOUNTER
Spoke with Dr. Rod Hurtado office, updated them that patient is having surgery today and that Dr. Anna Das will not be prescribing pain medication post op.

## 2021-09-22 NOTE — ANESTHESIA POSTPROCEDURE EVALUATION
Department of Anesthesiology  Postprocedure Note    Patient: Aniyah Cohen  MRN: [id number]  YOB: 1970  Date of evaluation: 9/22/2021  Time:  12:44 PM     Procedure Summary     Date: 09/22/21 Room / Location: [facility] OR 05 / [facility]    Anesthesia Start: 8455 Anesthesia Stop: 6595    Procedures:       LEFT HAND SUBTOTAL PALMAR FASCIECTOMY WITH THUMB AND SMALL FINGER DUPUYTREN'S CONTRACTURE RELEASE (Left Hand)      NODULE EXCISION MIDDLE, RING, SMALL FINGER (Left Hand) Diagnosis: (DUPUYTREN'S CONTRACTURE LEFT)    Surgeons: Karene Lundborg, MD Responsible Provider: Naila Craft MD    Anesthesia Type: general ASA Status: 2          Anesthesia Type: general    Floresita Phase I: Floresita Score: 10    Floresita Phase II: Floresita Score: 10    Last vitals: Reviewed and per EMR flowsheets. Anesthesia Post Evaluation    Patient location during evaluation: PACU  Patient participation: complete - patient participated  Level of consciousness: awake and alert  Airway patency: patent  Nausea & Vomiting: no nausea and no vomiting  Complications: no  Cardiovascular status: hemodynamically stable  Respiratory status: acceptable  Hydration status: euvolemic  Comments: Department of Anesthesiology  Post-Anesthesia Note    Name:  Aniyah Cohen                                         Age:  54 y.o.   MRN:  [id number]     Last Vitals:  BP (!) 129/90   Pulse 70   Temp 97.9 °F (36.6 °C) (Tympanic)   Resp 17   Ht 5' 8\" (1.727 m)   Wt 185 lb (83.9 kg)   LMP 10/15/2015   SpO2 98%   BMI 28.13 kg/m²   Patient Vitals in the past 4 hrs:  09/22/21 1130, BP:(!) 129/90, Pulse:70, Resp:17, SpO2:98 %  09/22/21 1100, BP:124/84, Pulse:65, Resp:19, SpO2:96 %  09/22/21 1040, BP:136/88, Pulse:59, Resp:16, SpO2:95 %  09/22/21 1030, BP:136/88, Temp:97.9 °F (36.6 °C), Temp src:Tympanic, Pulse:63, Resp:17, SpO2:96 %  09/22/21 1025, BP:(!) 144/80, Pulse:60, Resp:17, SpO2:99 %  09/22/21 1015, BP:(!) 144/80, Pulse:70, Resp:16, SpO2:99 %  09/22/21 1010, BP:136/80, Pulse:63, Resp:17, SpO2:99 %  09/22/21 1005, BP:136/80, Temp:97.8 °F (36.6 °C), Temp src:Tympanic, Pulse:72, Resp:14, SpO2:100 %  09/22/21 0950, BP:124/75, Pulse:64, Resp:16, SpO2:100 %  09/22/21 0935, BP:127/76, Temp:96.7 °F (35.9 °C), Temp src:Temporal, Pulse:63, Resp:11, SpO2:100 %    Level of Consciousness:  Awake    Respiratory:  Stable    Oxygen Saturation:  Stable    Cardiovascular:  Stable    Hydration:  Adequate    PONV:  Stable    Post-op Pain:  Adequate analgesia    Post-op Assessment:  No apparent anesthetic complications    Additional Follow-Up / Treatment / Comment:  None    Sandi Lyles MD  September 22, 2021   12:44 PM

## 2021-09-22 NOTE — H&P
she has been smoking cigarettes. She has been smoking about 0.25 packs per day. She has never used smokeless tobacco.  ETOH:   reports no history of alcohol use. DRUGS:   reports no history of drug use. ACTIVITIES OF DAILY LIVING:    OCCUPATION:    Family History:   Family History             Problem Relation Age of Onset    Thyroid Disease Mother      Diabetes Mother           pre diabetic    Heart Disease Father      Heart Attack Father 52             Diabetes Maternal Grandmother      Heart Attack Maternal Grandmother      Heart Disease Maternal Grandmother      High Blood Pressure Maternal Grandmother      Stroke Maternal Grandmother      Diabetes Maternal Aunt      Diabetes Maternal Grandfather              REVIEW OF SYSTEMS:  CONSTITUTIONAL:  negative  EYES:  negative  HEENT:  negative  RESPIRATORY:  negative  CARDIOVASCULAR:  negative  GASTROINTESTINAL:  negative  GENITOURINARY:  negative  INTEGUMENT/BREAST:  negative  HEMATOLOGIC/LYMPHATIC:  negative  ALLERGIC/IMMUNOLOGIC:  negative  ENDOCRINE:  negative  MUSCULOSKELETAL:  Left hand contracture  NEUROLOGICAL:  negative  BEHAVIOR/PSYCH:  negative     PHYSICAL EXAM:    VITALS:  Resp 18   Ht 5' 8\" (1.727 m)   Wt 180 lb (81.6 kg)   LMP 10/15/2015   BMI 27.37 kg/m²   CONSTITUTIONAL:  awake, alert, cooperative, no apparent distress, and appears stated age  EYES:  Lids and lashes normal, pupils equal, round and reactive to light, extra ocular muscles intact, sclera clear, conjunctiva normal  ENT:  Normocephalic, without obvious abnormality, atraumatic, sinuses nontender on palpation, external ears without lesions, oral pharynx with moist mucus membranes, tonsils without erythema or exudates, gums normal and good dentition.   NECK:  Supple, symmetrical, trachea midline, no adenopathy, thyroid symmetric, not enlarged and no tenderness, skin normal  LUNGS:  CTA  CARDIOVASCULAR:  2+ radial pulses, extremities warm and well perfused  ABDOMEN: NTTP  CHEST:  Atraumatic   GENITAL/URINARY:  deferred  NEUROLOGIC:  Awake, alert, oriented to name, place and time. Cranial nerves II-XII are grossly intact. Motor is 5 out of 5 bilaterally. Sensory is intact.  gait is normal.  MUSCULOSKELETAL:     Left upper extremity: Non-tender about the shoulder and elbow with good ROM. - tinels of the cubital tunnel, - tinels of the carpal tunnel, - durkans, - finkelsteins, - CMC grind, - tenderness over the A1 pulleys with no active triggering. Small finger PIP joint flexion contracture of 65 degrees with dupuytrens nodules to the index, middle, ring, and small fingers. Median, ulnar, radial n intact to light touch. Brisk capillary refill. Gross motor 5/5.         DATA:    CBC:         Lab Results   Component Value Date     WBC 6.8 02/12/2016     RBC 5.10 02/12/2016     HGB 14.9 02/12/2016     HCT 46.0 02/12/2016     MCV 90.2 02/12/2016     MCH 29.3 02/12/2016     MCHC 32.5 02/12/2016     RDW 14.1 02/12/2016      02/12/2016     MPV 9.9 02/12/2016      PT/INR:  No results found for: PROTIME, INR     Radiology Review:  Xray: x-rays of the left hand were obtained today in the office and reviewed with the patient. 3 views: AP, lateral, oblique: demonstrate no acute fracture or dislocation. Small finger is held in flexion at the PIP joint. Impression: No acute fracture dislocations        IMPRESSION:  · Left small finger dupuytrens contracture  · Bilateral dupuytrens nodules  · Bilateral Garrod's nodes  · Bilateral middle finger trigger  · Fibromyalgia  · RA     PLAN:  Discussed findings with patient. Discussed conservative and surgical management with the patient. Discussed Xiaflex injection to the small finger PIP joint versus surgical management. Patient would like to proceed with surgery.  Plan for a left subtotal palmar fasciectomy with small finger Dupuytren's contracture release and middle and ring finger nodule excision with possible small finger nodule excision. Did discuss Dupuytren's with the patient. Educated the patient on Dupuytren's. Patient understands there is no cure for Dupuytren's. Literature was provided to the patient. Postop recovery explained to the patient. Patient would like to proceed. All questions answered.     I explained the risks, benefits, alternatives and complications of surgery with the patient including but not limited to the risks of infection, possible damage to nerves, vessels, or tendons, stiffness, loss of range of motion, scar sensitivity, wound healing complications, worsening symptoms, possible need for therapy, as well as the possible need further surgery and unanticipated complications. The patient voiced understanding and all questions were answered. The patient elected to proceed with surgical intervention.         I have seen and evaluated the patient and agree with the above assessment and plan on today's visit. I have performed the key components of the history and physical examination with significant findings of Dupuytren's contracture and multiple dorsal nodes. I concur with the findings and plan as documented.     The patient was counseled at length about the risks of bharathi Covid-19 during their perioperative period and any recovery window from their procedure. The patient was made aware that bharathi Covid-19  may worsen their prognosis for recovering from their procedure  and lend to a higher morbidity and/or mortality risk. All material risks, benefits, and reasonable alternatives including postponing the procedure were discussed. The patient does wish to proceed with the procedure at this time. History and Physical Update     Patient was seen and examined. Patient's history and physical was reviewed with the patient. There has been no significant interval changes.

## 2021-09-22 NOTE — ANESTHESIA PRE PROCEDURE
BMI:   Wt Readings from Last 3 Encounters:   09/22/21 185 lb (83.9 kg)   08/10/21 180 lb (81.6 kg)   04/12/19 192 lb (87.1 kg)     Body mass index is 28.13 kg/m². CBC:   Lab Results   Component Value Date    WBC 6.8 02/12/2016    RBC 5.10 02/12/2016    HGB 14.9 02/12/2016    HCT 46.0 02/12/2016    MCV 90.2 02/12/2016    RDW 14.1 02/12/2016     02/12/2016       CMP:   Lab Results   Component Value Date     02/12/2016    K 4.6 02/12/2016     02/12/2016    CO2 23 02/12/2016    BUN 13 02/12/2016    CREATININE 0.9 02/12/2016    GFRAA >60 02/12/2016    LABGLOM >60 02/12/2016    GLUCOSE 92 02/12/2016    GLUCOSE 91 03/08/2011    PROT 7.4 02/12/2016    CALCIUM 9.5 02/12/2016    BILITOT 0.3 02/12/2016    ALKPHOS 68 02/12/2016    AST 21 02/12/2016    ALT 23 02/12/2016       POC Tests: No results for input(s): POCGLU, POCNA, POCK, POCCL, POCBUN, POCHEMO, POCHCT in the last 72 hours. Coags: No results found for: PROTIME, INR, APTT    HCG (If Applicable): No results found for: PREGTESTUR, PREGSERUM, HCG, HCGQUANT     ABGs: No results found for: PHART, PO2ART, YGD1UKP, OUS3WXL, BEART, K4LDLTPS     Type & Screen (If Applicable):  No results found for: LABABO, LABRH    Drug/Infectious Status (If Applicable):  No results found for: HIV, HEPCAB    COVID-19 Screening (If Applicable):   Lab Results   Component Value Date    COVID19 Not Detected 09/17/2021           Anesthesia Evaluation  Patient summary reviewed no history of anesthetic complications:   Airway: Mallampati: II  TM distance: >3 FB   Neck ROM: full   Dental:    (+) lower dentures and upper dentures      Pulmonary: breath sounds clear to auscultation  (+) current smoker          Patient did not smoke on day of surgery.                  Cardiovascular:Negative CV ROS            Rhythm: regular  Rate: normal           Beta Blocker:  Not on Beta Blocker         Neuro/Psych:   (+) neuromuscular disease (Fibromyalgia):, headaches (Stress headaches):, depression/anxiety             GI/Hepatic/Renal:   (+) GERD:,           Endo/Other:    (+) : arthritis: rheumatoid. , .                  ROS comment: medical marijuana Abdominal:             Vascular: negative vascular ROS. Other Findings:             Anesthesia Plan      general     ASA 2     (#4 LMA)  Induction: intravenous. MIPS: Postoperative opioids intended and Prophylactic antiemetics administered. Anesthetic plan and risks discussed with patient. Plan discussed with CRNA.                   Malik Tolentino MD   9/22/2021

## 2021-09-22 NOTE — BRIEF OP NOTE
Brief Postoperative Note      Patient: Suma Chavez  YOB: 1970  MRN: [id number]    Date of Procedure: 9/22/2021    Pre-Op Diagnosis: DUPUYTREN'S CONTRACTURE LEFT    Post-Op Diagnosis: Same       Procedure(s):  LEFT HAND SUBTOTAL PALMAR FASCIECTOMY WITH THUMB AND SMALL FINGER DUPUYTREN'S CONTRACTURE RELEASE  NODULE EXCISION MIDDLE, [address], SMALL FINGER    Surgeon(s):  Fatmata Gates MD    Assistant:  Physician Assistant: DEBBIE Ambrosio  Resident: Anthony Choi DO    Anesthesia: General    Estimated Blood Loss (mL): Minimal    Complications: None    Specimens:   ID Type Source Tests Collected by Time Destination   A : LEFT HAND 1ST AND 5TH DIGIT DEPUTRYN'S Tissue Tissue SURGICAL PATHOLOGY Fatmata Gates MD 9/22/2021 0900        Implants:  * No implants in log *      Drains: * No LDAs found *    Findings: see op note    Electronically signed by DEBBIE Ambrosio on 9/22/2021 at 9:35 AM

## 2021-09-22 NOTE — PROGRESS NOTES
Denture returned to patient in PACU 1 from anesthesia.
Have you been tested for COVID  Yes           Have you been told you were positive for COVID No  Have you had any known exposure to someone that is positive for COVID No  Do you have a cough                   No              Do you have shortness of breath No                 Do you have a sore throat            No                Are you having chills                    No                Are you having muscle aches. No                    Please come to the hospital wearing a mask and have your significant other wear a mask as well. Both of you should check your temperature before leaving to come here,  if it is 100 or higher please call [phone] for instruction.
procedure to notify you if your arrival time changes    [x] If you receive a survey after surgery we would greatly appreciate your comments    [] Parent/guardian of a minor must accompany their child and remain on the premises  the entire time they are under our care     [] Pediatric patients may bring favorite toy, blanket or comfort item with them    [] A caregiver or family member must remain with the patient during their stay if they are mentally handicapped, have dementia, disoriented or unable to use a call light or would be a safety concern if left unattended    [x] Please notify surgeon if you develop any illness between now and time of surgery (cold, cough, sore throat, fever, nausea, vomiting) or any signs of infections  including skin, wounds, and dental.    []  The Outpatient Pharm[facility] is available to fill your prescription here on your day of surgery, ask your preop nurse for details    [] Other instructions    EDUCATIONAL MATERIALS PROVIDED:    [] PAT Preoperative Education Packet/Booklet     [] Medication List    [] Transfusion bracelet applied with instructions    [] Shower with soap, lather and rinse well, and use CHG wipes provided the evening before surgery as instructed    [] Incentive spirometer with instructions

## 2021-09-23 NOTE — OP NOTE
[address] [address]                                OPERATIVE REPORT    PATIENT NAME: Alesha Mukherjee                   :        1970  MED REC NO:   [id number]                            ROOM:  ACCOUNT NO:   [de-identified]                           ADMIT DATE: 2021  PROVIDER:     Brandon Carranza MD    DATE OF PROCEDURE:  2021    PREOPERATIVE DIAGNOSES:  1. Left hand Dupuytren's disease of palm. 2.  Left small finger Dupuytren's contracture including the abductor  digiti minimi cord. 3.  Left thumb Dupuytren's contracture. 4.  Left little finger dorsal proximal interphalangeal joint mass/node. 5.  Left ring finger dorsal proximal interphalangeal joint mass/node. POSTOPERATIVE DIAGNOSES:  1. Left hand Dupuytren's disease of palm. 2.  Left small finger Dupuytren's contracture including the abductor  digiti minimi cord. 3.  Left thumb Dupuytren's contracture. 4.  Left little finger dorsal proximal interphalangeal joint mass/node. 5.  Left ring finger dorsal proximal interphalangeal joint mass/node. PROCEDURES PERFORMED:  1. Left hand subtotal palmar fasciectomy. 2.  Left thumb Dupuytren's contracture release. 3.  Left little finger Dupuytren's contracture release and excision of  abductor digiti minimi cord. 4.  Left little finger dorsal mass excision. 5.  Left ring finger dorsal mass excision. SURGEON:  Brandon Carranza M.D. ANESTHESIA:  1. General.  2.  Local anesthetic by surgeon consisting of approximately 10 mL of  0.25% Marcaine plain. ESTIMATED BLOOD LOSS:  Minimal.    ASSISTANTS:  1. Ernst Do DO, Orthopedic Surgery Resident  2. Redd Cowan, physician assistant certified. She was present  throughout the procedure. Her assistance was used for preoperative  positioning, intraoperative retraction, closure, and dressing  application.   Her assistance expedited the case and decreased the  surgical time. TOTAL TOURNIQUET TIME:  Approximately 71 minutes at 250 mmHg of brachial  tourniquet. SPECIMENS:  Excised tissue was sent for pathology. FINDINGS:  1. Significant Dupuytren's contracture of the palm extending from the  third metacarpal ulnarly to the small finger. 2.  Small finger cord; had a central cord as well as spiral cord as well  as an abductor digiti minimi cord extending to the level of the distal  interphalangeal joint that was excised. 3.  There was palmar tissue contracture involving the first webspace as  well as the thumb MCP joint that was released. 4.  Dorsal masses to the little and ring finger were consistent with  Dupuytren's diatheses. DISPOSITION:  The patient remained stable throughout the procedure. OPERATIVE INDICATIONS:  The patient is a very pleasant 82-year-old  female with worsening contracture of the left palm and hand and little  finger and on presentation on the day of surgery, she was complaining of  worsening thumb symptoms. The patient wished to proceed with surgical  intervention. Both surgical and nonsurgical treatment options were  explained. The risks of surgery included, but not limited to the risk  of infection, damage to nerves, vessels, or tendons, failure to improve  his symptoms or worsening of symptoms, recurrence or progression of the  disease process, recurrence of the contracture, wound healing  complications, need for therapy and/or additional surgery were  explained. She understood and wished to proceed. DESCRIPTION OF PROCEDURE:  The patient was identified in the holding  area. The left hand was identified as the surgical site. He was then  seen by Anesthesia, taken to the operating room, placed supine on the  table, and underwent general anesthesia per Anesthesia. All bony  prominences were well padded. A well-padded arm tourniquet was placed.    The left upper extremity was prepared and draped in the standard sterile  fashion. Arm was exsanguinated. Tourniquet inflated to 250 mmHg. Total tourniquet time was 71 minutes. Attention was first directed towards the little finger dorsal PIP joint  mass. Longitudinal incision was made with a 15-blade scalpel. Sharp  dissection was used to elevate up the skin flaps radially and ulnarly. The extensor mechanism was identified proximally as well as distally. There was a firmly adherent and involved mass involving the extensor  mechanism. Both the central as well as radial lateral band were  identified and protected and the nodule sharply resected off of the  extensor mechanism while preserving the extensor tendons. The mass was  consistent with a Dupuytren's nodule. Similarly, a dorsal incision with 15-blade scalpel was made over the  ring finger. Flaps were elevated. The extensor mechanism was  identified over the proximal interphalangeal joint including the central  slip as well as lateral band. With these protected, the nodule was  removed off the extensor mechanism while preserving the extensor  tendons. This was again consistent with a Dupuytren's nodule. The  wounds were copiously irrigated out and skin closed with nylon suture. Attention was then directed towards the subtotal palmar fasciectomy. A  transverse incision at the level of the distal palmar flexion crease  from the level of the third metacarpal ulnarly to the level of the fifth  metacarpal and it extended obliquely proximally in a Z-type fashion to  the distal aspect of the transverse carpal ligament and extending in an  oblique fashion across the small finger metacarpophalangeal joint. Under loupe magnification, skin flaps were elevated to expose underlying  diseased palmar fascia. This was extended radially and ulnarly as well  as proximal and distally until normal fascia was identified.   The flaps  were retracted and the common neurovascular bundle to the index and  middle, middle and ring were identified and protected. Diseased fascia  was then released distally and reflected proximally. The diseased  fascia was then reflected from radial to ulnar while protecting the  underlying superficial palmar arch and median nerve structures. This  subtotal palmar fasciectomy was completed and excised. Attention was then directed towards the small finger. The common  neurovascular bundles to the ring and little finger was identified and  traced proximally into the diseased tissue proximally. The superficial  palmar arch was identified. The diseased palmar fascia at this level  was then reflected from proximal to distal.  The ulnar neurovascular  bundles of the little finger was intimately involved in the abductor  digiti minimi cord and was meticulously dissected out under loupe  magnification from proximal to distal and ultimately from distal to  proximal.    The Brunner incision was extended to the level of distal interphalangeal  joint of the little finger. Short Brunner incisions over the proximal  and middle phalanges were created followed by elevation of the flaps. The radial neurovascular bundle was identified and traced out. The  diseased tissue was then reflected from radial to ulnar identifying the  ulnar digital neurovascular bundle, which was intimately involved and  spiraled around the abductor digiti minimi cord. This cord was released  distally while protecting the distal neurovascular bundle. The bundle  was dissected out from distal to proximal and ultimately this abductor  digiti minimi cord was excised while preserving the abductor digiti  minimi muscle and tendinous unit. The nerve was intact throughout its  visualized course. Gentle manipulation resulted in full MCP and PIP  correction to the little finger. Attention was then directed towards the thumb. An oblique incision over  the thumb metacarpophalangeal joint was then created.   The radial  neurovascular bundle followed by the ulnar neurovascular bundle were  identified and the diseased tissue was released off the palmar aspect of  the proximal phalanx as well as involving the first webspace and  excised. This corrected the first webspace contracture as well as thumb  MCP joint contracture. At this point, the wound was copiously irrigated  out. The tourniquet was deflated. Total tourniquet time was 71  minutes. Meticulous hemostasis was achieved with bipolar cautery. With  the digits in full extension, there was brisk cap refill of the little  finger as well as all digits of the left hand. The skin was closed with  multiple nylon sutures. Wide V advancement flaps were used to close the  small finger tissue. No need for skin graft occurred. The fingers were  healthy and pink and viable at the conclusion of the case. A bulky  sterile dressing and splint was applied. The patient was taken to the  recovery room.         Patrice Carrillo MD    D: 09/22/2021 17:34:02       T: 09/22/2021 17:38:02     [email or web address]  Job#: [id number]     Doc#: [id number]    CC:

## 2021-09-27 ENCOUNTER — EVALUATION (OUTPATIENT)
Dept: OCCUPATIONAL THERAPY | Age: 51
End: 2021-09-27
Payer: COMMERCIAL

## 2021-09-27 ENCOUNTER — OFFICE VISIT (OUTPATIENT)
Dept: ORTHOPEDIC SURGERY | Age: 51
End: 2021-09-27

## 2021-09-27 VITALS — BODY MASS INDEX: 28.04 KG/M2 | RESPIRATION RATE: 16 BRPM | HEIGHT: 68 IN | WEIGHT: 185 LBS

## 2021-09-27 DIAGNOSIS — M72.0 DUPUYTREN'S CONTRACTURE OF LEFT HAND: Primary | ICD-10-CM

## 2021-09-27 PROCEDURE — 99024 POSTOP FOLLOW-UP VISIT: CPT | Performed by: PHYSICIAN ASSISTANT

## 2021-09-27 PROCEDURE — 97530 THERAPEUTIC ACTIVITIES: CPT | Performed by: OCCUPATIONAL THERAPIST

## 2021-09-27 PROCEDURE — 97760 ORTHOTIC MGMT&TRAING 1ST ENC: CPT | Performed by: OCCUPATIONAL THERAPIST

## 2021-09-27 PROCEDURE — 97110 THERAPEUTIC EXERCISES: CPT | Performed by: OCCUPATIONAL THERAPIST

## 2021-09-27 PROCEDURE — 97140 MANUAL THERAPY 1/> REGIONS: CPT | Performed by: OCCUPATIONAL THERAPIST

## 2021-09-27 NOTE — PROGRESS NOTES
HPI: Patient presents today postop day #5 status post left subtotal palmar fasciectomy with Dupuytren's contracture release. Overall the patient is doing well. She is been in her splint till today's visit. Pain controlled. Physical Exam: Incisions are clean dry intact with sutures in place. No erythema or signs of infection. Fingers held in extension. Stiffness with flexion of the digits. She does report some diminished but intact sensation to the ulnar digital nerve of the small finger. Otherwise remains neurovascular intact. Brisk cap refill. Assessment: postop day #5 status post left subtotal palmar fasciectomy with Dupuytren's contracture release    Plan:   Patient referred to therapy for splint fabrication and range of motion exercises. Range of motion exercises also demonstrated the patient at today's visit. Patient follow-up next week for nurse visit for suture removal.  Follow up in 5 weeks for reevaluation. Call with any questions or concerns. All questions and concerns answered.

## 2021-09-27 NOTE — PROGRESS NOTES
OCCUPATIONAL THERAPY EVALUATION/Splint Application Only   Phone: [phone]   Fax: [phone]     Date:  2021      Patient Name:  Milo Stanford    :  1970    Restrictions/Precautions: Follow Dupuytren's Contracture Release Protocol; low fall risk  Diagnosis:  L Thumb & SF Dupuytren's contracture release & hoang fasciectomy   M72.0 (ICD-10-CM) - Dupuytren's contracture of left hand       Date of Surgery/Injury: 2021 sx    Insurance/Certification information: [facility] [employer] of care signed (Y/N): N  Visit# / total visits:     Referring Practitioner:  Dr. Kali Mireles MD  Specific Practitioner Orders: Splint fabrication, modalities prn    Past Medical History:   Past Medical History:   Diagnosis Date    Anxiety     Fibromyalgia     PMS (premenstrual syndrome)     RA (rheumatoid arthritis) ([facility] [address]     Stress headaches      Past Surgical History:   Past Surgical History:   Procedure Laterality Date    CARPAL TUNNEL RELEASE      both hands     SECTION      CHOLECYSTECTOMY      COLPOSCOPY      DILATION AND CURETTAGE OF UTERUS      HAND SURGERY Left 2021    LEFT HAND SUBTOTAL PALMAR FASCIECTOMY WITH THUMB AND SMALL FINGER DUPUYTREN'S CONTRACTURE RELEASE performed by Kali Mireles MD at [place] [address] HAND SURGERY Left 2021    NODULE EXCISION MIDDLE, RING, SMALL FINGER performed by Kali Mireles MD at [facility]      NASAL SINUS SURGERY      TUBAL LIGATION         Reason for Referral: Pt is a pleasant 54year old female presenting to outpatient occupational therapy s/p L Thumb & SF Dupuytren's contracture release & hoang fasciectomy . She presents following physician postop appt - Per Jeanne(PA) pt's thumb did not require splinting. Pt presents today for splint fabrication and ROM ex's only. Pt will be transferring to [facility] in [place] for OT evaluation for further treatment and will be completed when appropriate.     Splint Fabricated/Provided: Pt was fabricated a hand-based dorsal RF/SF extension splint s/p Dupuytren's contracture release of the SF. Pt was able to properly don/doff splint. Education Provided/Intervention: Patient was provided with verbal education/handout regarding splint care, wear, precautions, and hygiene. Further educated on diagnosis, prognosis, and protocol. Therapist completed wound care and educated on further managing incisions. She was wrapped in fresh, sterile bandaging that allowed for movement of the digits. She was provided with supplies to re-dress self as needed. Pt educated on AROM exercises to complete with handouts provided which included tendon glides all positions, thumb AROM all planes, thumb IP/MP blocking, and tenodesis ex. To complete 4-6x/day at 10-15 reps and 3-5 sec end range holds. Pt educated on pain and edema management techniques to initiate. Overall, tolerated well with good understanding demonstrated throughout session. Splint Care: Splint can be washed with mild soap and cool water. Splint needs to air dry. Avoid leaving splint in or near a source of heat such as a hot car or a space heater. Use nail polish remover to remove stains on splint. Use Purell will decrease any odor that may develop. Splint Precuations: Patient was instructed to remove splint and contact therapist if the following occur:   1. Redness that lasts longer that 15-20 mins   2. Increased swelling   3. Increased pain   4. Pressure Sores    Wear Schedule: 24/7; may remove for hygiene and exercises 4-6x/day    Rehab Potential: Good   Recommendations: Outpatient OT  Goal Formulation: Patient  Requires OT Follow Up: Yes    Plan   Plan: Plan of care initiated. Pt was seen today for splint and ROM ex's only. Pt may need an additional visit for splint modification. Pt is transferring to rehab clinic closer to her residency.  Pt will schedule herself for formal OT evaluation to complete when appropriate for further assessment of diagnosis and to establish new goals based on deficits. Goals (1 session):  1. Patient will verbalize and demo ability to don/doff splint appropriately in 1 session with good accuracy   Goal Met.  2. Patient will verbalize understanding of splint precautions, splint care, and wear schedule in 1 session   Goal Met.  3. Patient will demonstrate understand of stretching and/or exercise provided in 1 session. Goal Met. Total Tx Time: 55 min    By co-signing this document, I demonstrate that I have reviewed the Plan of Care established for skilled therapy services and certify that the services are required and that they will be provided while the patient is under my care. If I have any comments or revisions to make to the POC, I will notify the evaluating therapist at the earliest convenience.      Marisol Godoy OTR/L, [facility], #[id number]

## 2021-10-05 ENCOUNTER — NURSE ONLY (OUTPATIENT)
Dept: ORTHOPEDIC SURGERY | Age: 51
End: 2021-10-05

## 2021-10-05 DIAGNOSIS — M72.0 DUPUYTREN'S CONTRACTURE OF LEFT HAND: Primary | ICD-10-CM

## 2021-10-05 NOTE — PROGRESS NOTES
Patient presents today postop day #13 status post left subtotal palmar fasciectomy with Dupuytren's contracture release. Overall the patient is doing well. She is been in her splint till today's visit      Incisions are clean dry intact with sutures in place. No erythema or signs of infection. She does report some diminished but intact sensation to the ulnar digital nerve of the small finger. Otherwise remains neurovascular intact. Brisk cap refill. Suture removed today in office without difficulty. Steri stipes and d/s/d was applied to incision. Pt was instructed to keep dressing clean, dry and intact for the next 12-24 hours. Patient may leave incision open to air if no drainage note after the 12-24 hours. Patient verbalized understanding. She is to follow up with Dr. Alexander Moran in 3/4 weeks.

## 2021-11-01 ENCOUNTER — OFFICE VISIT (OUTPATIENT)
Dept: ORTHOPEDIC SURGERY | Age: 51
End: 2021-11-01

## 2021-11-01 ENCOUNTER — EVALUATION (OUTPATIENT)
Dept: OCCUPATIONAL THERAPY | Age: 51
End: 2021-11-01

## 2021-11-01 VITALS — RESPIRATION RATE: 18 BRPM | HEIGHT: 68 IN | WEIGHT: 180 LBS | BODY MASS INDEX: 27.28 KG/M2

## 2021-11-01 DIAGNOSIS — M65.352 TRIGGER FINGER, LEFT LITTLE FINGER: Primary | ICD-10-CM

## 2021-11-01 DIAGNOSIS — M72.0 DUPUYTREN'S CONTRACTURE OF LEFT HAND: ICD-10-CM

## 2021-11-01 PROCEDURE — 99024 POSTOP FOLLOW-UP VISIT: CPT | Performed by: ORTHOPAEDIC SURGERY

## 2021-11-01 RX ORDER — METHYLPREDNISOLONE 4 MG/1
TABLET ORAL
Qty: 1 KIT | Refills: 0 | Status: SHIPPED | OUTPATIENT
Start: 2021-11-01 | End: 2021-11-07

## 2021-11-01 NOTE — LETTER
[address] [facility]  [address] [place] [address]  Phone: [phone]  Fax: [address] [facility], [place]        November 1, 2021     Patient: Christie Quinones   YOB: 1970   Date of Visit: 11/1/2021       To Whom It May Concern: It is my medical opinion that Vicci Taryn should remain out of work until next follow up appointment . If you have any questions or concerns, please don't hesitate to call.     Sincerely,        Le Young MD

## 2021-11-01 NOTE — PROGRESS NOTES
Phone: [phone] Fax: [phone]     Occupational Therapy   Cancellation/No-show Note     Patient Name:  Chanel Fletcher  : 1970  Date:  2021  MRN: [id number]    For today's appointment patient:       []  Cancelled   []  Rescheduled appointment   [x]  No-show     Reason given by patient:   []  Patient ill   []  Conflicting appointment   []  No transportation   []  Conflict with work   [x]  No reason given   []  Other:     Comments: Pt called and unable to leave a message due to mailbox being full.  Will call to reschedule evaluation     Electronically signed by: [address], OTR/L #[id number]

## 2021-11-01 NOTE — PROGRESS NOTES
6 weeks out left hand subtotal palmar fasciectomy with contracture release, Dupuytren's release from thumb, and Dupuytren nodules excision middle ring and little fingers. Overall she reports that things are slowly improving with therapy. She denies cold intolerance. She does have some paresthesias and scar sensitivity. Physical exam: Scars are maturing nicely. She does have some hypertrophic scarring to the mid palmar wound. Sensitivity over the thumb scar as well as over the small finger scar. She is able to get the small finger fully extended. She does have a slight residual PIP joint contracture present. Sensation intact light touch all digits. Assessment: 6 weeks postop    Plan    Medrol Dosepak prescribed. She was provided information on silicone sheets and Mederma. Continue with therapy focusing on scar desensitization, modalities, and may progress with strengthening. I did advise the patient a Medrol Dosepak can help with some of the residual swelling and stiffness. She consented and this was provided. She was cautioned to keep the hand is warm as possible as winter and cold weather can be very sensitive.

## 2021-12-16 ENCOUNTER — OFFICE VISIT (OUTPATIENT)
Dept: ORTHOPEDIC SURGERY | Age: 51
End: 2021-12-16
Payer: COMMERCIAL

## 2021-12-16 VITALS — HEIGHT: 68 IN | BODY MASS INDEX: 27.28 KG/M2 | WEIGHT: 180 LBS | RESPIRATION RATE: 18 BRPM

## 2021-12-16 DIAGNOSIS — R52 PAINFUL SCAR: Primary | ICD-10-CM

## 2021-12-16 DIAGNOSIS — L90.5 PAINFUL SCAR: Primary | ICD-10-CM

## 2021-12-16 PROCEDURE — 99024 POSTOP FOLLOW-UP VISIT: CPT | Performed by: ORTHOPAEDIC SURGERY

## 2021-12-16 PROCEDURE — 20600 DRAIN/INJ JOINT/BURSA W/O US: CPT | Performed by: ORTHOPAEDIC SURGERY

## 2021-12-16 RX ORDER — BETAMETHASONE SODIUM PHOSPHATE AND BETAMETHASONE ACETATE 3; 3 MG/ML; MG/ML
6 INJECTION, SUSPENSION INTRA-ARTICULAR; INTRALESIONAL; INTRAMUSCULAR; SOFT TISSUE ONCE
Status: COMPLETED | OUTPATIENT
Start: 2021-12-16 | End: 2021-12-16

## 2021-12-16 RX ADMIN — BETAMETHASONE SODIUM PHOSPHATE AND BETAMETHASONE ACETATE 6 MG: 3; 3 INJECTION, SUSPENSION INTRA-ARTICULAR; INTRALESIONAL; INTRAMUSCULAR; SOFT TISSUE at 16:46

## 2021-12-16 NOTE — PROGRESS NOTES
12 weeks out left hand subtotal palmar fasciectomy with contracture release, Dupuytren's release from thumb, and Dupuytren nodules excision middle ring and little fingers. Overall she reports that she continues to improve with therapy. She continues to have some scar sensitivity localized to the incision in line with the small digit. Cold intolerance is present, but improving    Physical exam: Scars are maturing nicely. She does have some hypertrophic scarring to the mid palmar wound. No sensitivity over the thumb scar and palmar scar. Severe scar sensitivity at the small finger scar. She is able to get the small finger fully extended . She does have a 30 deg PIP joint contracture present. Sensation intact light touch all digits. Assessment: 12 weeks postop    Plan    Discussed findings with the patient. We discussed all treatment modalities in regards to her continued scar sensitivity. She agreed to have a cortisone injection to help break up the scar formation. She is to continue therapy with focus on scar management. Follow up as needed. Procedure Note Finger Joint Injection    The left small finger scar area of sensitivity was identified as the injection site. The risk and benefits of a cortisone injection were explained and the patient consented to the injection. Under sterile conditions, the joint was injected with a mixture of 1 mL of 1% Lidocaine and 1 mL of 6 mg/mL Betamethasone without complication. A sterile bandage was applied. I have seen and evaluated the patient and agree with the above assessment and plan on today's visit. I have performed the key components of the history and physical examination with significant findings of painful scar left palm. I concur with the findings and plan as documented.     Zoya Ramirez MD  12/16/2021

## (undated) DEVICE — GAUZE,SPONGE,4"X4",8PLY,STRL,LF,10/TRAY: Brand: MEDLINE

## (undated) DEVICE — GLOVE SURG SZ 65 THK91MIL LTX FREE SYN POLYISOPRENE

## (undated) DEVICE — GLOVE SURG SZ 6 THK91MIL LTX FREE SYN POLYISOPRENE ANTI

## (undated) DEVICE — SPLINT ORTH W4XL15IN PLSTR OF PARIS LO EXOTHERM SMOOTH

## (undated) DEVICE — COVER HNDL LT DISP

## (undated) DEVICE — SOLUTION IRRIG 1000ML 0.9% SOD CHL USP POUR PLAS BTL

## (undated) DEVICE — LEAD HAND

## (undated) DEVICE — DRESSING,GAUZE,XEROFORM,CURAD,1"X8",ST: Brand: CURAD

## (undated) DEVICE — DOUBLE BASIN SET: Brand: MEDLINE INDUSTRIES, INC.

## (undated) DEVICE — OSTEOTOME SURG L5IN BLDE W15MM STR MINI LAMBOTTE

## (undated) DEVICE — TRAY SET HAND REUSABLE

## (undated) DEVICE — CRADLE ARM W8.75XH12.5XL16IN FOAM SUPP ELEVATION VENT

## (undated) DEVICE — INTENDED FOR TISSUE SEPARATION, AND OTHER PROCEDURES THAT REQUIRE A SHARP SURGICAL BLADE TO PUNCTURE OR CUT.: Brand: BARD-PARKER ® STAINLESS STEEL BLADES

## (undated) DEVICE — 4-PORT MANIFOLD: Brand: NEPTUNE 2

## (undated) DEVICE — PADDING,UNDERCAST,COTTON, 3X4YD STERILE: Brand: MEDLINE

## (undated) DEVICE — PADDING CAST W2INXL4YD COT LO LINTING WYTEX

## (undated) DEVICE — BNDG,ELSTC,MATRIX,STRL,2"X5YD,LF,HOOK&LP: Brand: MEDLINE

## (undated) DEVICE — PADDING UNDERCAST W4INXL4YD COT FBR LO LINTING WYTEX

## (undated) DEVICE — SOLUTION IV IRRIG WATER 1000ML POUR BRL 2F7114

## (undated) DEVICE — SURGICAL PROCEDURE PACK HND

## (undated) DEVICE — Device

## (undated) DEVICE — ZIMMER® STERILE DISPOSABLE TOURNIQUET CUFF WITH PLC, DUAL PORT, SINGLE BLADDER, 18 IN. (46 CM)

## (undated) DEVICE — GLOVE ORANGE PI 8 1/2   MSG9085